# Patient Record
Sex: FEMALE | Race: WHITE
[De-identification: names, ages, dates, MRNs, and addresses within clinical notes are randomized per-mention and may not be internally consistent; named-entity substitution may affect disease eponyms.]

---
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
## 2020-02-20 ENCOUNTER — HOSPITAL ENCOUNTER (EMERGENCY)
Dept: HOSPITAL 25 - UCCORT | Age: 20
Discharge: HOME | End: 2020-02-20
Payer: COMMERCIAL

## 2020-02-20 VITALS — DIASTOLIC BLOOD PRESSURE: 92 MMHG | SYSTOLIC BLOOD PRESSURE: 132 MMHG

## 2020-02-20 DIAGNOSIS — R09.89: ICD-10-CM

## 2020-02-20 DIAGNOSIS — R07.9: ICD-10-CM

## 2020-02-20 DIAGNOSIS — R09.81: ICD-10-CM

## 2020-02-20 DIAGNOSIS — R05: ICD-10-CM

## 2020-02-20 DIAGNOSIS — B34.9: Primary | ICD-10-CM

## 2020-02-20 DIAGNOSIS — M79.10: ICD-10-CM

## 2020-02-20 DIAGNOSIS — R11.0: ICD-10-CM

## 2020-02-20 DIAGNOSIS — H92.09: ICD-10-CM

## 2020-02-20 DIAGNOSIS — J02.9: ICD-10-CM

## 2020-02-20 PROCEDURE — 99211 OFF/OP EST MAY X REQ PHY/QHP: CPT

## 2020-02-20 PROCEDURE — G0463 HOSPITAL OUTPT CLINIC VISIT: HCPCS

## 2020-02-20 NOTE — UC
FLU HPI





- HPI Summary


HPI Summary: 





Myalgias for two days. Runny nose yesterday. Difficulty sleeping last night. 

Sore throat and nausea without vomiting today. No known fever. Patient wants to 

be tested for influenza.





- History of Current Complaint


Chief Complaint: UCRespiratory


Stated Complaint: SORE THROAT


Time Seen by Provider: 02/20/20 11:09


Hx Obtained From: Patient


Hx Last Menstrual Period: 2/11/2020


Pregnant?: No


Onset/Duration: Sudden Onset, Lasting Days - 2


Severity Currently: Mild


Severity Initially: Mild


Pain Intensity: 2


Associated Signs & Symptoms: Positive: Myalgia, Cough, Sore Throat, Headache





- Allergy/Home Medications


Allergies/Adverse Reactions: 


 Allergies











Allergy/AdvReac Type Severity Reaction Status Date / Time


 


No Known Allergies Allergy   Verified 02/20/20 11:08











Home Medications: 


 Home Medications





Amphetamine MIXED SALT TAB* [Adderall TAB*] 10 mg PO DAILY PRN 02/20/20 [

History Confirmed 02/20/20]


norgestimate-ethinyl estradioL [Tri-Lo-Stephania Tablet] 1 each PO DAILY 02/20/20 [

History Confirmed 02/20/20]











PMH/Surg Hx/FS Hx/Imm Hx


Previously Healthy: Yes





- Surgical History


Surgical History: Yes


Surgery Procedure, Year, and Place: WISDOM TEETH EXTRACTIONS





- Family History


Known Family History: Positive: Hypertension





- Social History


Alcohol Use: Rare


Substance Use Type: None


Smoking Status (MU): Never Smoked Tobacco





- Immunization History


Most Recent Influenza Vaccination: NO





Review of Systems


All Other Systems Reviewed And Are Negative: Yes


Constitutional: Positive: Fever


ENT: Positive: Sore Throat, Ear Ache, Sinus Congestion


Respiratory: Positive: Cough


Cardiovascular: Positive: Chest Pain


Gastrointestinal: Positive: Negative


Genitourinary: Positive: Negative


Motor: Positive: Negative


Neurovascular: Positive: Negative


Musculoskeletal: Positive: Arthralgia, Decreased ROM, Myalgia


Neurological/Mental Status: Positive: Negative


Psychological: Positive: Negative


Is Patient Immunocompromised?: No





Physical Exam


Triage Information Reviewed: Yes


Appearance: Well-Nourished, Ill-Appearing, Pain Distress


Vital Signs: 


 Initial Vital Signs











Temp  98.3 F   02/20/20 11:07


 


Pulse  76   02/20/20 11:07


 


Resp  16   02/20/20 11:07


 


BP  132/92   02/20/20 11:07


 


Pulse Ox  99   02/20/20 11:07











Vital Signs Reviewed: Yes


Eye Exam: Normal


ENT: Positive: Pharyngeal erythema, Nasal congestion, TM bulging


Dental Exam: Normal


Neck exam: Normal


Respiratory Exam: Normal


Respiratory: Positive: Chest non-tender, Lungs clear, Normal breath sounds


Cardiovascular Exam: Normal


Cardiovascular: Positive: RRR, No Murmur, Pulses Normal


Abdominal Exam: Normal


Musculoskeletal Exam: Normal


Neurological Exam: Normal


Psychological Exam: Normal


Skin Exam: Normal





Flu Course/Dx





- Course


Course Of Treatment: 





hx obtained, exam performed, meds reviewed, flu swab obtained per patient 

request.





- Differential Dx/Diagnosis


Provider Diagnosis: 


 Viral syndrome








Discharge ED





- Sign-Out/Discharge


Documenting (check all that apply): Patient Departure


All imaging exams completed and their final reports reviewed: No Studies





- Discharge Plan


Condition: Stable


Disposition: HOME


Patient Education Materials:  Viral Syndrome (ED)


Referrals: 


No Primary Care Phys,NOPCP [Primary Care Provider] - 


Additional Instructions: 


1. rest, and get plenty of flids


2. symptom management.


3. FOllow up if symptoms worsen.





- Billing Disposition and Condition


Condition: STABLE


Disposition: Home